# Patient Record
Sex: FEMALE | Race: WHITE | NOT HISPANIC OR LATINO | Employment: UNEMPLOYED | RURAL
[De-identification: names, ages, dates, MRNs, and addresses within clinical notes are randomized per-mention and may not be internally consistent; named-entity substitution may affect disease eponyms.]

---

## 2022-02-14 ENCOUNTER — INFUSION (OUTPATIENT)
Dept: INFECTIOUS DISEASES | Facility: HOSPITAL | Age: 67
End: 2022-02-14
Attending: NURSE PRACTITIONER
Payer: COMMERCIAL

## 2022-02-14 VITALS
HEART RATE: 77 BPM | TEMPERATURE: 98 F | DIASTOLIC BLOOD PRESSURE: 85 MMHG | OXYGEN SATURATION: 95 % | SYSTOLIC BLOOD PRESSURE: 164 MMHG

## 2022-02-14 PROCEDURE — 63600175 PHARM REV CODE 636 W HCPCS

## 2022-02-14 PROCEDURE — 25000003 PHARM REV CODE 250

## 2022-02-14 PROCEDURE — M0247 HC IV INFUSION, SOTROVIMAB, INCL POST ADMIN MONIT: HCPCS

## 2022-02-14 RX ORDER — ACETAMINOPHEN 325 MG/1
650 TABLET ORAL ONCE AS NEEDED
Status: ACTIVE | OUTPATIENT
Start: 2022-02-14 | End: 2033-07-13

## 2022-02-14 RX ORDER — EPINEPHRINE 0.3 MG/.3ML
0.3 INJECTION SUBCUTANEOUS
Status: ACTIVE | OUTPATIENT
Start: 2022-02-14 | End: 2022-02-21

## 2022-02-14 RX ORDER — ALBUTEROL SULFATE 90 UG/1
2 AEROSOL, METERED RESPIRATORY (INHALATION)
Status: ACTIVE | OUTPATIENT
Start: 2022-02-14 | End: 2022-02-21

## 2022-02-14 RX ORDER — ONDANSETRON 4 MG/1
4 TABLET, ORALLY DISINTEGRATING ORAL
Status: ACTIVE | OUTPATIENT
Start: 2022-02-14 | End: 2022-02-21

## 2022-02-14 RX ORDER — SODIUM CHLORIDE 0.9 % (FLUSH) 0.9 %
10 SYRINGE (ML) INJECTION
Status: ACTIVE | OUTPATIENT
Start: 2022-02-14 | End: 2022-02-21

## 2022-02-14 RX ORDER — DIPHENHYDRAMINE HYDROCHLORIDE 50 MG/ML
25 INJECTION INTRAMUSCULAR; INTRAVENOUS
Status: ACTIVE | OUTPATIENT
Start: 2022-02-14 | End: 2022-02-21

## 2022-02-14 RX ADMIN — SODIUM CHLORIDE 500 MG: 9 INJECTION, SOLUTION INTRAVENOUS at 03:02

## 2022-02-14 NOTE — PROGRESS NOTES
1725 Discharge instructions given. Left ambulatory with a cane through the ER exit. Left with her .